# Patient Record
Sex: MALE | Employment: OTHER | URBAN - METROPOLITAN AREA
[De-identification: names, ages, dates, MRNs, and addresses within clinical notes are randomized per-mention and may not be internally consistent; named-entity substitution may affect disease eponyms.]

---

## 2017-01-05 ENCOUNTER — RECORDS - HEALTHEAST (OUTPATIENT)
Dept: LAB | Facility: CLINIC | Age: 67
End: 2017-01-05

## 2017-01-05 LAB
CHOLEST SERPL-MCNC: 124 MG/DL
FASTING STATUS PATIENT QL REPORTED: NORMAL
HDLC SERPL-MCNC: 48 MG/DL
LDLC SERPL CALC-MCNC: 61 MG/DL
TRIGL SERPL-MCNC: 75 MG/DL

## 2018-04-16 ENCOUNTER — RECORDS - HEALTHEAST (OUTPATIENT)
Dept: LAB | Facility: CLINIC | Age: 68
End: 2018-04-16

## 2018-04-16 LAB
ALBUMIN SERPL-MCNC: 3.8 G/DL (ref 3.5–5)
ALP SERPL-CCNC: 99 U/L (ref 45–120)
ALT SERPL W P-5'-P-CCNC: 15 U/L (ref 0–45)
ANION GAP SERPL CALCULATED.3IONS-SCNC: 12 MMOL/L (ref 5–18)
AST SERPL W P-5'-P-CCNC: 16 U/L (ref 0–40)
BILIRUB SERPL-MCNC: 0.7 MG/DL (ref 0–1)
BUN SERPL-MCNC: 22 MG/DL (ref 8–22)
CALCIUM SERPL-MCNC: 9.7 MG/DL (ref 8.5–10.5)
CHLORIDE BLD-SCNC: 101 MMOL/L (ref 98–107)
CO2 SERPL-SCNC: 25 MMOL/L (ref 22–31)
CREAT SERPL-MCNC: 0.9 MG/DL (ref 0.7–1.3)
GFR SERPL CREATININE-BSD FRML MDRD: >60 ML/MIN/1.73M2
GLUCOSE BLD-MCNC: 256 MG/DL (ref 70–125)
POTASSIUM BLD-SCNC: 4.1 MMOL/L (ref 3.5–5)
PROT SERPL-MCNC: 6.9 G/DL (ref 6–8)
SODIUM SERPL-SCNC: 138 MMOL/L (ref 136–145)

## 2018-08-30 ENCOUNTER — RECORDS - HEALTHEAST (OUTPATIENT)
Dept: LAB | Facility: CLINIC | Age: 68
End: 2018-08-30

## 2018-08-30 LAB
ALBUMIN SERPL-MCNC: 4 G/DL (ref 3.5–5)
ALP SERPL-CCNC: 72 U/L (ref 45–120)
ALT SERPL W P-5'-P-CCNC: 17 U/L (ref 0–45)
ANION GAP SERPL CALCULATED.3IONS-SCNC: 9 MMOL/L (ref 5–18)
AST SERPL W P-5'-P-CCNC: 20 U/L (ref 0–40)
BILIRUB SERPL-MCNC: 1.1 MG/DL (ref 0–1)
BUN SERPL-MCNC: 16 MG/DL (ref 8–22)
CALCIUM SERPL-MCNC: 9.8 MG/DL (ref 8.5–10.5)
CHLORIDE BLD-SCNC: 101 MMOL/L (ref 98–107)
CHOLEST SERPL-MCNC: 125 MG/DL
CO2 SERPL-SCNC: 28 MMOL/L (ref 22–31)
CREAT SERPL-MCNC: 0.81 MG/DL (ref 0.7–1.3)
FASTING STATUS PATIENT QL REPORTED: NORMAL
GFR SERPL CREATININE-BSD FRML MDRD: >60 ML/MIN/1.73M2
GLUCOSE BLD-MCNC: 242 MG/DL (ref 70–125)
HDLC SERPL-MCNC: 59 MG/DL
LDLC SERPL CALC-MCNC: 57 MG/DL
POTASSIUM BLD-SCNC: 4.2 MMOL/L (ref 3.5–5)
PROT SERPL-MCNC: 6.7 G/DL (ref 6–8)
SODIUM SERPL-SCNC: 138 MMOL/L (ref 136–145)
TRIGL SERPL-MCNC: 45 MG/DL

## 2019-04-05 ENCOUNTER — TRANSFERRED RECORDS (OUTPATIENT)
Dept: HEALTH INFORMATION MANAGEMENT | Facility: CLINIC | Age: 69
End: 2019-04-05

## 2019-04-05 LAB — HBA1C MFR BLD: 11 % (ref 4.2–6.1)

## 2019-06-11 NOTE — PROGRESS NOTES
SUBJECTIVE/OBJECTIVE:                           Maik Hammond is a 68 year old male coming in for an initial visit for Medication Therapy Management.  He was referred to me from Dr. Freire for diabetes medications.    PCP:  Dr. Freire, [facility]    Chief Complaint: Medication review, diabetes management.    Allergies/ADRs: Bydureon, severe nausea  Tobacco: No tobacco use  Alcohol: not currently using  Caffeine: 4 cups/day of coffee  Activity: Does a Wii workout, 1 hr every other day.  Mostly cardio, with jumping jacks and other exercises.  Medical History:  DOFV: 9/6/05c  DM 2 - meds/labs/evals through [facility]  hyperlipidemia  hx of MI 2000  hypertension    Medication Adherence/Access:  no issues reported.  Has  $450 deductible at beginning of the year, but his meds are affordable after that.   Per patient, misses medication 0 times per week.     Diabetes:    Pt currently taking Metformin  mg BID, Lantus 23 units daily (increased from 21 units last week), and pt restarted glimepiride 2mg QAM on his own 3 days ago.  He does not want to add another injectable medication, and wants to discuss oral medication options.    Pt is experiencing the following side effects: mild tremors since starting glimepiride again.  This was more significant when on Bydureon.  Pt also previously had acid reflux from glimepiride when on 2 mg BID.  Pt denies GI upset, diarrhea with metformin.  He stopped Bydureon about a month ago, and is just starting to get his appetite back now.  He reports losing significant weight, could barely eat, and didn't want to get out of bed.  Hx: Farxiga, stopped when changed to basal insulin.  Invokana - doesn't recall having side effects, thinks it was stopped due to cost.  Originally had samples.  Pioglitazone, doesn't remember why stopped.  SMBG: one time daily.     Ranges (patient reported, mg/dL):   6/12 - 202  6/11 - 370  6/10 - 316  Recent symptoms of high blood sugar? polydipsia,  polyuria, polyphagia, fatigue, tingling and numbness  Eye exam: up to date  Foot exam: up to date  ACEi/ARB: Yes: Lisinopril 5mg daily.   Aspirin: Taking 81mg daily and denies side effects  Diet/Exercise:   Breakfast: Chicken fahita ingredients with out tortilla,   Lunch: sandwich, and a cookie  Dinner: liver sausage and pickles.  Doesn't usually snack a lot.       GLYCOSYLATED HGB A1C - 04/05/2019      NAME VALUE REFERENCE RANGE LAB   F HGB A1C 11.0 H 4.2-6.1 (%)          GLYCOSYLATED HGB A1C - 12/04/2018      NAME VALUE REFERENCE RANGE LAB   F HGB A1C 10.2 H 4.2-6.1 (%)          Hypertension: Current medications include Lisinopril 5mg daily.  Patient does not self-monitor BP.  Patient reports no current medication side effects.    Hyperlipidemia:   Current therapy includes Simvastatin 40mg once daily.  Pt reports no significant myalgias or other side effects.      LIPID CASCADE - 08/30/2018      NAME VALUE REFERENCE RANGE    CHOLESTEROL 125 <=199 (mg/dL)    TRIGLYCERIDES 45 <=149 (mg/dL)    HDL CHOLESTEROL 59 >=40 (mg/dL)    LDL CHOLESTEROL CALCULATED 57 <=129 (mg/dL)       GERD:   Current medications include: Zantac (ranitidine) 150mg twice daily. Pt c/o nausea, but improving since stopping Bydureon. When he was on glimepiride 2mg BID, he noticed this worsened his acid reflux.  Patient feels that current regimen is effective.    Supplements/OTCs:   Currently taking Multivitamin daily, Co-Q 10 300mg daily PRN, and Move Free Condroitin 1 tablet daily.  Denies issues with these, and feels the condroitin is very effective for joint pain.        COMPREHENSIVE METABOLIC - 08/30/2018       NAME VALUE REFERENCE RANGE    SODIUM 138 136-145 (mmol/L)    POTASSIUM 4.2 3.5-5.0 (mmol/L)    CHLORIDE 101  (mmol/L)    CO2 28 22-31 (mmol/L)    ANION GAP, CALCULATION 9 5-18 (mmol/L)    GLUCOSE 242 H  (mg/dL)    BLOOD UREA NITROGEN 16 8-22 (mg/dL)    CREATININE 0.81 0.70-1.30 (mg/dL)    GFR MDRD AF AMER >60 >60  (mL/min/1.73m2)    GFR MDRD NON AF AMER >60 >60 (mL/min/1.73m2)    BILIRUBIN, TOTAL 1.1 H 0.0-1.0 (mg/dL)    CALCIUM 9.8 8.5-10.5 (mg/dL)    PROTEIN, TOTAL 6.7 6.0-8.0 (g/dL)    ALBUMIN 4.0 3.5-5.0 (g/dL)    ALKALINE PHOSPHATASE 72  (U/L)    AST 20 0-40 (U/L)    ALT 17 0-45 (U/L)       Today's Vitals: /78 (BP Location: Left arm, Cuff Size: Adult Regular)       ASSESSMENT:                             Current medications were reviewed today. Medicare Part D topics discussed:Self-monitoring, Medication safety, Lab monitoring, Recommendations to doctor and Medication changes    Medication Adherence: excellent, no issues identified    Diabetes:   Needs Improvement.   Patient's A1c of 11% is not meeting A1c goal of < 7.5%. Self monitoring of blood glucose is not at goal of fasting  mg/dL.   Pt had resumed glimepiride on his own 3 days ago.  Pt would benefit from continuing his current trial back on glimepiride 2mg daily, as his morning fasting BG have improved.  If his tremors worsen, or the acid reflux returns could consider other therapy.    An SGLT2-inhibitor would be a good option.  This would likely reduce A1c between 0.8-1%, may help with weight loss, improve BP.  Jardiance specifically has shown to help prevent decline in renal dysfunction, and beneficial in patients with ASCVD.  Jardiance is covered by his insurance per the online formulary.  Would prefer to avoid injectable medications, and pt had severe nausea on Bydureon.    Could also consider maximizing his dose of Metformin, but may be beneficial to check is serum creatinine prior to doing this.  Could consider a DPP-4 inhibitors, but would be the same cost as SGLT2 inhibitor and less effective (A1c reduction 0.5-0.7%)  Aspirin therapy is safe and appropriate.    Hypertension:   Stable. Patient is meeting BP goal of < 140/90mmHg.     Hyperlipidemia:   Stable.   Pt is not on high intensity statin which is indicated based on 2018 ACC/AHA  guidelines for lipid management, however wouldn't recommend changing to a high intensity statin.  His last LDL was 57, and his MI occurred 19 years ago.  Pt would prefer not to increase as well.    GERD:   Stable.  Will want to monitor for increased acid reflux now that he started glimepiride.    Supplements/OTCs:   Stable.    PLAN:                              1) Recommend continuing glimepiride 2 mg daily, and follow up in 2 weeks on home blood glucose.    2)  At follow up if BG still above goal, could consider adding Jardiance 10 mg once daily and rechecking BMP 2 weeks later.    Dr. Freire agreed with recommendations:  Dutch Freire MD 6/13/2019 10:37:00 AM > sounds like a good plan.  thank you.       I spent 60 minutes with this patient today (an extra 15 minutes was spent creating the Medication Action Plan). I offer these suggestions for consideration by Dr. Freire. A copy of the visit note was provided to the patient's primary care provider.    Will follow up on 6/26 at 2:30 PM via phone encounter.    The patient declined a summary of these recommendations as an after visit summary.     Mel Silverman, PharmD  Medication Therapy Management Pharmacist  Pager: [phone]

## 2019-06-12 ENCOUNTER — OFFICE VISIT (OUTPATIENT)
Dept: PHARMACY | Facility: PHYSICIAN GROUP | Age: 69
End: 2019-06-12
Payer: COMMERCIAL

## 2019-06-12 VITALS — SYSTOLIC BLOOD PRESSURE: 125 MMHG | DIASTOLIC BLOOD PRESSURE: 78 MMHG

## 2019-06-12 DIAGNOSIS — E78.5 HYPERLIPIDEMIA LDL GOAL <70: ICD-10-CM

## 2019-06-12 DIAGNOSIS — K21.9 GASTROESOPHAGEAL REFLUX DISEASE WITHOUT ESOPHAGITIS: ICD-10-CM

## 2019-06-12 DIAGNOSIS — E11.8 TYPE 2 DIABETES MELLITUS WITH COMPLICATION, WITHOUT LONG-TERM CURRENT USE OF INSULIN (H): Primary | ICD-10-CM

## 2019-06-12 DIAGNOSIS — I10 BENIGN ESSENTIAL HYPERTENSION: ICD-10-CM

## 2019-06-12 DIAGNOSIS — Z78.9 TAKES DIETARY SUPPLEMENTS: ICD-10-CM

## 2019-06-12 PROCEDURE — 99207 ZZC NO CHARGE LOS: CPT | Performed by: PHARMACIST

## 2019-06-12 RX ORDER — BLOOD-GLUCOSE METER
KIT MISCELLANEOUS 2 TIMES DAILY
COMMUNITY

## 2019-06-12 NOTE — LETTER
"     [facility]     Date: 2019    Maik Hammond  [address] [address]    Dear Mr. Hammond,    Thank you for talking with me on 2019 about your health and medications. Medicare s MTM (Medication Therapy Management) program helps you understand your medications and use them safely.      This letter includes an action plan (Medication Action Plan) and medication list (Personal Medication List). The action plan has steps you should take to help you get the best results from your medications. The medication list will help you keep track of your medications and how to use them the right way.       Have your action plan and medication list with you when you talk with your doctors, pharmacists, and other healthcare providers in your care team.     Ask your doctors, pharmacists, and other healthcare providers to update the action plan and medication list at every visit.     Take your medication list with you if you go to the hospital or emergency room.     Give a copy of the action plan and medication list to your family or caregivers.     If you want to talk about this letter or any of the papers with it, please call   [phone].   We look forward to working with you, your doctors, and other healthcare providers to help you stay healthy.     Sincerely,    Mel Silverman      Enclosed: Medication Action Plan and Personal Medication List    MEDICATION ACTION PLAN FOR Maik Hammond,  1950     This action plan will help you get the best results from your medications if you:   1. Read \"What we talked about.\"   2. Take the steps listed in the \"What I need to do\" boxes.   3. Fill in \"What I did and when I did it.\"   4. Fill in \"My follow-up plan\" and \"Questions I want to ask.\"     Have this action plan with you when you talk with your doctors, pharmacists, and other healthcare providers in your care team. Share this with your family or caregivers " too.  DATE PREPARED: 2019  What we talked about:  We will follow up in two weeks on your blood sugars, and if they are still high we can consider adding a medication like Jardiance to help get your blood sugars down.  I will discuss this with Dr. Freire and see what he thinks about this plan.                             What I need to do: Continue taking glimepiride 2 mg once daily, and checking home blood sugars over the next two weeks.        What I did and when I did it:                                              My follow-up plan:                 Questions I want to ask:              If you have any questions about your action plan, call Mel Dolorescarmen, Phone: [phone] , Monday-Friday 8-4:30pm.           MEDICATION LIST FOR Maik Hammond, ARACELI 1950     This medication list was made for you after we talked. We also used information from your doctor's chart.      Use blank rows to add new medications. Then fill in the dates you started using them.    Cross out medications when you no longer use them. Then write the date and why you stopped using them.    Ask your doctors, pharmacists, and other healthcare providers to update this list at every visit. Keep this list up-to-date with:       Prescription medications    Over the counter drugs     Herbals    Vitamins    Minerals      If you go to the hospital or emergency room, take this list with you. Share this with your family or caregivers too.     DATE PREPARED: 2019  Allergies or side effects: No clinical screening - see comments     Medication:  ASPIRIN 81 MG PO TABS      How I use it:  Take 1 tablet by mouth daily In morning      Why I use it:  Heart Protection    Prescriber:  Patient Reported      Date I started using it:       Date I stopped using it:         Why I stopped using it:            Medication:  GLIMEPIRIDE 2 MG PO TABS      How I use it:  Take 2 mg by mouth 2 times daily      Why I use it:  Diabetes    Prescriber:    Tani      Date I started using it:       Date I stopped using it:         Why I stopped using it:            Medication:  LISINOPRIL 5 MG PO TABS      How I use it:  Take 5 mg by mouth daily Morning        Why I use it:  Blood Pressure    Prescriber:  Dr. Freire      Date I started using it:       Date I stopped using it:         Why I stopped using it:            Medication:  METFORMIN HCL  MG PO TB24      How I use it:  Take 750 mg by mouth 2 times daily      Why I use it:  Diabetes    Prescriber:  Dr. Freire      Date I started using it:       Date I stopped using it:         Why I stopped using it:            Medication:  MOVE FREE PO Chondroitin      How I use it:  Take 1 capsule by mouth daily      Why I use it:  Joint Pain    Prescriber:  Patient Reported      Date I started using it:       Date I stopped using it:         Why I stopped using it:            Medication:  MULTIVITAMIN OR      How I use it:  Take 1 tablet by mouth daily      Why I use it:  Supplements    Prescriber:  Patient Reported      Date I started using it:       Date I stopped using it:         Why I stopped using it:            Medication:  SIMVASTATIN 40 MG PO TABS      How I use it:  Take 1 tablet by mouth At Bedtime      Why I use it:  High Cholesterol    Prescriber:  Dr. Freire      Date I started using it:       Date I stopped using it:         Why I stopped using it:            Medication:         How I use it:         Why I use it:      Prescriber:         Date I started using it:       Date I stopped using it:         Why I stopped using it:            Medication:         How I use it:         Why I use it:      Prescriber:         Date I started using it:       Date I stopped using it:         Why I stopped using it:            Medication:         How I use it:         Why I use it:      Prescriber:         Date I started using it:       Date I stopped using it:         Why I stopped using it:              Other  Information:     If you have any questions about your action plan, call [phone].    According to the Paperwork Reduction Act of 1995, no persons are required to respond to a collection of information unless it displays a valid OMB control number. The valid OMB number for this information collection is [id number]. The time required to complete this information collection is estimated to average 40 minutes per response, including the time to review instructions, searching existing data resources, gather the data needed, and complete and review the information collection. If you have any comments concerning the accuracy of the time estimate(s) or suggestions for improving this form, please write to: CMS, Attn: JANNY Reports Clearance Officer, [address] [place] [phone].

## 2019-06-26 ENCOUNTER — ALLIED HEALTH/NURSE VISIT (OUTPATIENT)
Dept: PHARMACY | Facility: PHYSICIAN GROUP | Age: 69
End: 2019-06-26
Payer: COMMERCIAL

## 2019-06-26 DIAGNOSIS — E11.8 TYPE 2 DIABETES MELLITUS WITH COMPLICATION, WITHOUT LONG-TERM CURRENT USE OF INSULIN (H): Primary | ICD-10-CM

## 2019-06-26 PROCEDURE — 99207 ZZC NO CHARGE LOS: CPT | Performed by: PHARMACIST

## 2019-06-26 NOTE — PROGRESS NOTES
SUBJECTIVE/OBJECTIVE:                Maik Hammond is a 68 year old male called for a follow-up visit for Medication Therapy Management.  He was referred to me from Dr. Freire. Pt also followed up with Araseli, care coordinator today.    Chief Complaint: Follow up from our visit on 6/12.  Diabetes meds.      Tobacco: No tobacco use  Alcohol: not currently using    Medication Adherence/Access:  no issues reported       Diabetes:    Pt currently taking Metformin  mg BID, Lantus 23 units daily (increased from 21 units 6/12), and glimepiride 2mg (restarted 6/16).     Pt is experiencing the following side effects: mild tremors since starting glimepiride again, and is wondering if it could be causing his joint pain.  Pt asking if taking glimepiride at night would help with joint pain.    Hx: Farxiga, stopped when changed to basal insulin.  Invokana - doesn't recall having side effects, thinks it was stopped due to cost.  Originally had samples.  Pioglitazone, doesn't remember why stopped.  SMBG: one time daily.     Ranges (patient reported, mg/dL):   156, 120 113 (last few days)  Hypoglycemia? Since restarting glimepiride, he had one episode of feeling very faint/dizzy and almost fell over.  Recent symptoms of high blood sugar? polydipsia, polyuria, polyphagia, fatigue, tingling and numbness  Eye exam: up to date  Foot exam: up to date  ACEi/ARB: Yes: Lisinopril 5mg daily.   Aspirin: Taking 81mg daily and denies side effects  Diet/Exercise:   Breakfast: Chicken fahita ingredients with out tortilla,   Lunch: sandwich, and a cookie  Dinner: liver sausage and pickles.  Doesn't usually snack a lot.        GLYCOSYLATED HGB A1C - 04/05/2019        NAME VALUE REFERENCE RANGE LAB   F HGB A1C 11.0 H 4.2-6.1 (%)              GLYCOSYLATED HGB A1C - 12/04/2018        NAME VALUE REFERENCE RANGE LAB   F HGB A1C 10.2 H 4.2-6.1 (%)               Today's Vitals: There were no vitals taken for this visit. - telephone  encounter.      ASSESSMENT:              Current medications were reviewed today as discussed above.      Medication Adherence: excellent, no issues identified    Diabetes:  Needs Improvement.   Patient's A1c of 11% is not meeting A1c goal of < 7.5%. Self monitoring of blood glucose is not at goal of fasting  mg/dL.   Possible low BG from glimepiride.  Glimepiride is not likely causing his joint paint, and I wouldn't recommend he take this at night due to risk of hypoglycemia.  It does have a peak after 3-4 hours of taking it, so could possibly take it at dinner.  Could also consider reducing dose of Lantus to prevent hypoglycemia, or reducing to glimepiride 1mg daily.  Pt due for next PCP apt after 7/5 so will be rescheduling soon.  Pt will call PCP if he has any other low BG.      PLAN:                  1) Continue current medications.  Pt agrees to call clinic if he continues to experience low BG.      I spent 30 minutes with this patient today. I offer these suggestions for consideration by dr. Freire. A copy of the visit note was provided to the patient's referring provider.     Will follow up in 2 weeks.    The patient declined a summary of these recommendations as an after visit summary.    Mel Silverman, PharmD  Medication Therapy Management Pharmacist  Pager: [phone]

## 2019-08-09 ENCOUNTER — TRANSFERRED RECORDS (OUTPATIENT)
Dept: HEALTH INFORMATION MANAGEMENT | Facility: CLINIC | Age: 69
End: 2019-08-09

## 2019-08-09 LAB — HBA1C MFR BLD: 9.3 % (ref 4.2–6.1)

## 2019-09-18 ENCOUNTER — ALLIED HEALTH/NURSE VISIT (OUTPATIENT)
Dept: PHARMACY | Facility: PHYSICIAN GROUP | Age: 69
End: 2019-09-18
Payer: COMMERCIAL

## 2019-09-18 DIAGNOSIS — E11.8 TYPE 2 DIABETES MELLITUS WITH COMPLICATION, WITHOUT LONG-TERM CURRENT USE OF INSULIN (H): Primary | ICD-10-CM

## 2019-09-18 PROCEDURE — 99207 ZZC NO CHARGE LOS: CPT | Performed by: PHARMACIST

## 2019-09-18 NOTE — PROGRESS NOTES
"SUBJECTIVE/OBJECTIVE:                Maik Hammond is a 68 year old male called for a follow-up visit for Medication Therapy Management.  He was referred to me from Dr. Freire.     Chief Complaint: Follow up from our visit on 6/26/19.  Follow up on diabetes.    Tobacco: No tobacco use  Alcohol: not currently using    Medication Adherence/Access:  Pt not taking Jardiance daily due to adverse effects.  Jardiance was expensive was around 300 dollars, but may also be in the coverage gap.    Diabetes:    Pt currently taking Metformin  mg BID, Lantus 23 units daily, glimepiride 2mg daily (restarted 6/16), Jardiance 10 mg taking twice weekly (started 8/9).  Pt is reporting urination urgency and increased frequency the day he takes it.  Has only been taking it twice per week, when he \"needs it\".  Tried to take Jardiance on days when he will be eating poorly, like on football games days.  Only taking 1 glimepiride daily, does have some shaking and can't write anything.     Pt is experiencing the following side effects: tremors in his hands since starting glimepiride again, makes it difficult to write.  Hx: Farxiga, stopped when changed to basal insulin.  Invokana - doesn't recall having side effects, thinks it was stopped due to cost.  Originally had samples.  Pioglitazone, doesn't remember why stopped.  SMBG: one time daily, AM fasting     Ranges (patient reported, mg/dL):   130-150 mg/dl most days, and the lowest was 110.  If not eating well, it may be up to 200 the next morning.    Hypoglycemia? None  Recent symptoms of high blood sugar? polydipsia, polyuria, polyphagia, fatigue, tingling and numbness  Eye exam: up to date  Foot exam: up to date  ACEi/ARB: Yes: Lisinopril 5mg daily.   Aspirin: Taking 81mg daily and denies side effects  Diet/Exercise: Pt has been more active and exercising a lot more.  Breakfast: Chicken fahita ingredients with out tortilla,   Lunch: sandwich, and a cookie  Dinner: liver sausage " and pickles.  Doesn't usually snack a lot.      GLYCOSYLATED HGB A1C - 08/09/2019       NAME VALUE REFERENCE RANGE LAB   F HGB A1C 9.3 H           Today's Vitals: There were no vitals taken for this visit. - telephone encounter.      ASSESSMENT:              Current medications were reviewed today as discussed above.      Medication Adherence: good, no issues identified    Diabetes:  Improving   Patient's A1c of 9.3% is not meeting A1c goal of < 7.5%. It has improved significantly since last visit. Monitoring of blood glucose is not at goal of fasting  mg/dL.   Pt would likely have improved blood sugars if he took Jardiance daily, but is having side effects when he takes it.  His blood sugars the mornings after taking it are significantly better compared to days when he hasn't ate as healthy.  He may benefit from trying 5 mg Jardiance every other day, but declines using it more frequently at this time.  He doesn't want to stop it, and feels it has been manageable the way he is taking it.  Pt would prefer to avoid trying alternative GLP-1 agonists due to severe GI symptom with Bydureon.  Pioglitazone would not be a good option for him given his CVD history, and had been on it previously.  Would prefer to avoid mealtime insulin.  Pt hesitant to increase basal insulin, but could consider slight dose titration as well at next PCP follow up.  He would also benefit from continuing to increase physical activity and eat healthy.  Would want to avoid increasing glimepiride due to current tremors he gets with this.    PLAN:                  1) Continue current medications.  Discussed that patient could try Jardiance 5 mg every other day to see if better tolerated at lower dose.  Patient will consider.    I spent 30 minutes with this patient today. I offer these suggestions for consideration by Dr. Freire. A copy of the visit note was provided to the patient's primary care provider.     Will follow up in 2 months.    The  patient declined a summary of these recommendations as an after visit summary.    Mel Silverman, PharmD  Medication Therapy Management Pharmacist  Pager: [phone]

## 2019-11-11 ENCOUNTER — RECORDS - HEALTHEAST (OUTPATIENT)
Dept: LAB | Facility: CLINIC | Age: 69
End: 2019-11-11

## 2019-11-11 LAB
ALBUMIN SERPL-MCNC: 3.9 G/DL (ref 3.5–5)
ALP SERPL-CCNC: 76 U/L (ref 45–120)
ALT SERPL W P-5'-P-CCNC: 18 U/L (ref 0–45)
ANION GAP SERPL CALCULATED.3IONS-SCNC: 10 MMOL/L (ref 5–18)
AST SERPL W P-5'-P-CCNC: 19 U/L (ref 0–40)
BILIRUB SERPL-MCNC: 1 MG/DL (ref 0–1)
BUN SERPL-MCNC: 19 MG/DL (ref 8–28)
CALCIUM SERPL-MCNC: 9.2 MG/DL (ref 8.5–10.5)
CHLORIDE BLD-SCNC: 102 MMOL/L (ref 98–107)
CHOLEST SERPL-MCNC: 122 MG/DL
CO2 SERPL-SCNC: 27 MMOL/L (ref 22–31)
CREAT SERPL-MCNC: 0.84 MG/DL (ref 0.7–1.3)
FASTING STATUS PATIENT QL REPORTED: NORMAL
GFR SERPL CREATININE-BSD FRML MDRD: >60 ML/MIN/1.73M2
GLUCOSE BLD-MCNC: 167 MG/DL (ref 70–125)
HDLC SERPL-MCNC: 59 MG/DL
LDLC SERPL CALC-MCNC: 49 MG/DL
POTASSIUM BLD-SCNC: 4.1 MMOL/L (ref 3.5–5)
PROT SERPL-MCNC: 6.6 G/DL (ref 6–8)
SODIUM SERPL-SCNC: 139 MMOL/L (ref 136–145)
TRIGL SERPL-MCNC: 72 MG/DL

## 2020-05-11 ENCOUNTER — TRANSFERRED RECORDS (OUTPATIENT)
Dept: HEALTH INFORMATION MANAGEMENT | Facility: CLINIC | Age: 70
End: 2020-05-11

## 2020-05-11 ENCOUNTER — RECORDS - HEALTHEAST (OUTPATIENT)
Dept: LAB | Facility: CLINIC | Age: 70
End: 2020-05-11

## 2020-05-11 LAB
HBA1C MFR BLD: 9 % (ref 4.2–6.1)
PSA SERPL-MCNC: 0.7 NG/ML (ref 0–6.5)

## 2020-09-18 ENCOUNTER — TRANSFERRED RECORDS (OUTPATIENT)
Dept: HEALTH INFORMATION MANAGEMENT | Facility: CLINIC | Age: 70
End: 2020-09-18

## 2020-09-18 LAB — HBA1C MFR BLD: 9.1 % (ref 4.2–6.1)

## 2020-09-18 ASSESSMENT — MIFFLIN-ST. JEOR
SCORE: 1569.68
SCORE: 1569.68

## 2020-09-29 ENCOUNTER — ALLIED HEALTH/NURSE VISIT (OUTPATIENT)
Dept: PHARMACY | Facility: PHYSICIAN GROUP | Age: 70
End: 2020-09-29
Payer: COMMERCIAL

## 2020-09-29 VITALS
WEIGHT: 189.6 LBS | DIASTOLIC BLOOD PRESSURE: 70 MMHG | BODY MASS INDEX: 29.76 KG/M2 | HEIGHT: 67 IN | HEART RATE: 88 BPM | SYSTOLIC BLOOD PRESSURE: 114 MMHG

## 2020-09-29 DIAGNOSIS — I10 BENIGN ESSENTIAL HYPERTENSION: ICD-10-CM

## 2020-09-29 DIAGNOSIS — E78.5 HYPERLIPIDEMIA LDL GOAL <70: ICD-10-CM

## 2020-09-29 DIAGNOSIS — E11.8 TYPE 2 DIABETES MELLITUS WITH COMPLICATION, WITHOUT LONG-TERM CURRENT USE OF INSULIN (H): Primary | ICD-10-CM

## 2020-09-29 PROCEDURE — 99605 MTMS BY PHARM NP 15 MIN: CPT | Performed by: PHARMACIST

## 2020-09-29 PROCEDURE — 99607 MTMS BY PHARM ADDL 15 MIN: CPT | Performed by: PHARMACIST

## 2020-09-29 NOTE — LETTER
"     [facility] - GEOVANNA MTM     Date: 2020    Maik Hammond  [address] [address]    Dear Mr. Hammond,    Thank you for talking with me on 2020 about your health and medications. Medicare s MTM (Medication Therapy Management) program helps you understand your medications and use them safely.      This letter includes an action plan (Medication Action Plan) and medication list (Personal Medication List). The action plan has steps you should take to help you get the best results from your medications. The medication list will help you keep track of your medications and how to use them the right way.       Have your action plan and medication list with you when you talk with your doctors, pharmacists, and other healthcare providers in your care team.     Ask your doctors, pharmacists, and other healthcare providers to update the action plan and medication list at every visit.     Take your medication list with you if you go to the hospital or emergency room.     Give a copy of the action plan and medication list to your family or caregivers.     If you want to talk about this letter or any of the papers with it, please call   [phone].   We look forward to working with you, your doctors, and other healthcare providers to help you stay healthy.     Sincerely,    Mel Silverman [facility]      Enclosed: Medication Action Plan and Personal Medication List    MEDICATION ACTION PLAN FOR Maik Hammond,  1950     This action plan will help you get the best results from your medications if you:   1. Read \"What we talked about.\"   2. Take the steps listed in the \"What I need to do\" boxes.   3. Fill in \"What I did and when I did it.\"   4. Fill in \"My follow-up plan\" and \"Questions I want to ask.\"     Have this action plan with you when you talk with your doctors, pharmacists, and other healthcare providers in your care team. Share this with your family or caregivers " too.  DATE PREPARED: 2020  What we talked about: You mentioned that the glimepiride causes tremors or shaking, and you would like to get off it.  We can attempt a trial off it, and then switch to an alternative medication.  Shaking or tremors are not a common side effect with this class of medications, so we can try an alternative medication (glipizide).  You were on this in the past and didn't have the tremors with this medication.                                         What I need to do: You can stop taking glimepiride for 1-2 weeks to see if the tremors or shaking improves at all.  If your blood sugar is high while going off the glimepiride, please take the full dose of your Jardiance 10 mg once daily to help counter act the increase in blood sugar.     Then after two weeks you can start taking glipizide XL 2.5 mg once daily in the morning before breakfast.       What I did and when I did it:                                              My follow-up plan:                 Questions I want to ask:              If you have any questions about your action plan, call Mel Silverman CHRISTOF[facility], Phone: [phone] , Monday-Friday 8-4:30pm.           MEDICATION LIST FOR Maik Hammond,  1950     This medication list was made for you after we talked. We also used information from your doctor's chart.      Use blank rows to add new medications. Then fill in the dates you started using them.    Cross out medications when you no longer use them. Then write the date and why you stopped using them.    Ask your doctors, pharmacists, and other healthcare providers to update this list at every visit. Keep this list up-to-date with:       Prescription medications    Over the counter drugs     Herbals    Vitamins    Minerals      If you go to the hospital or emergency room, take this list with you. Share this with your family or caregivers too.     DATE PREPARED: 2020  Allergies or side effects: No clinical  screening - see comments     Medication:  ASPIRIN 81 MG PO TABS      How I use it:  Take 1 tablet by mouth daily In morning      Why I use it:  Prevention of heart attack and stroke    Prescriber:  Dr. Freire      Date I started using it:       Date I stopped using it:         Why I stopped using it:            Medication:  COENZYME Q10 300 MG PO CAPS      How I use it:  Take 300 mg by mouth daily      Why I use it:  Supplements    Prescriber:  Patient Reported      Date I started using it:       Date I stopped using it:         Why I stopped using it:            Medication:  CONTOUR BLOOD GLUCOSE SYSTEM W/DEVICE KIT      How I use it:  2 times daily      Why I use it:  Diabetes    Prescriber:  Dr. Freire      Date I started using it:       Date I stopped using it:         Why I stopped using it:            Medication:  GLIPIZIDE XL 2.5 MG TABLETS      How I use it:  Take 2.5 mg by mouth daily in the morning before breakfast      Why I use it:  Diabetes    Prescriber:  Dr. Freire      Date I started using it:       Date I stopped using it:         Why I stopped using it:            Medication:  INSULIN GLARGINE  UNIT/ML SC SOPN      How I use it:  Inject 23 Units Subcutaneous every morning       Why I use it:  Diabetes    Prescriber:  Dr. Freire      Date I started using it:       Date I stopped using it:         Why I stopped using it:            Medication:  LISINOPRIL 5 MG PO TABS      How I use it:  Take 5 mg by mouth daily Morning        Why I use it: High blood pressure/ Kidney protection    Prescriber:  Dr. Freire      Date I started using it:       Date I stopped using it:         Why I stopped using it:            Medication:  METFORMIN HCL  MG PO TB24      How I use it:  Take 750 mg by mouth 2 times daily      Why I use it:  Diabetes    Prescriber:  Dr. Freire      Date I started using it:       Date I stopped using it:         Why I stopped using it:            Medication:  MOVE FREE PO       How I use it:  Take 1 capsule by mouth daily      Why I use it:  Supplements    Prescriber:  Patient Reported      Date I started using it:       Date I stopped using it:         Why I stopped using it:            Medication:  MULTIVITAMIN OR      How I use it:  Take 1 tablet by mouth daily      Why I use it:  Supplements    Prescriber:  Patient Reported      Date I started using it:       Date I stopped using it:         Why I stopped using it:            Medication:  SIMVASTATIN 40 MG PO TABS      How I use it:  Take 1 tablet by mouth At Bedtime      Why I use it:  High Cholesterol    Prescriber:  Dr. Freire      Date I started using it:       Date I stopped using it:         Why I stopped using it:            Medication:         How I use it:         Why I use it:      Prescriber:         Date I started using it:       Date I stopped using it:         Why I stopped using it:            Medication:         How I use it:         Why I use it:      Prescriber:         Date I started using it:       Date I stopped using it:         Why I stopped using it:            Medication:         How I use it:         Why I use it:      Prescriber:         Date I started using it:       Date I stopped using it:         Why I stopped using it:              Other Information:     If you have any questions about your action plan, call [phone].    According to the Paperwork Reduction Act of 1995, no persons are required to respond to a collection of information unless it displays a valid OMB control number. The valid OMB number for this information collection is 3896-7587. The time required to complete this information collection is estimated to average 40 minutes per response, including the time to review instructions, searching existing data resources, gather the data needed, and complete and review the information collection. If you have any comments concerning the accuracy of the time estimate(s) or suggestions for  improving this form, please write to: CMS, Attn: JANNY Reports Clearance Officer, [address] [place] [address], [place], [place] [address].

## 2020-09-29 NOTE — PATIENT INSTRUCTIONS
Recommendations from today's MTM visit:                                                    MTM (medication therapy management) is a service provided by a clinical pharmacist designed to help you get the most of out of your medicines.   Today we reviewed what your medicines are for, how to know if they are working, that your medicines are safe and how to make your medicine regimen as easy as possible.       1.  Recommend stopping glimepiride and starting Glipizide ER 2.5 mg daily.  I will talk to Dr. rFeire about this and if he's OK with the change will send in a prescription.  I would recommend stopping glimepiride for 1-2 weeks to see if the tremor gets better before starting the glipizide.  During this time you may need to take the full dose of Jardiance 10 mg to help with the blood sugars.    Future Considerations:  2.  Could consider increasing Jardiance to 25 mg, and taking a half tablet daily.  This would cost the same as your current dose of Jardiance.  If we did this, we would want to recheck your kidney labs two weeks after a dose increase.  3.  Recommend increasing Metformin to ER 1000 mg twice daily.      I couldn't find the income cut off information for the Jardiance patient assistance program, it isn't posted online.  Please call them to ask if you might qualify for the program:   [phone]     It was great to speak with you today.  I value your experience and would be very thankful for your time with providing feedback on our clinic survey. You may receive a survey via email or text message in the next few days.     Next MTM visit: 10/20/2020 at 1 PM, telephone follow up    To schedule another MTM appointment, please call the clinic directly or you may call the MTM scheduling line at [phone] or toll-free at [phone].     My Clinical Pharmacist's contact information:                                                      It was a pleasure talking with you today!  Please feel free to  contact me with any questions or concerns you have.      Mel Silverman PharmD  Medication Therapy Management Pharmacist  Pager: [phone]

## 2020-09-29 NOTE — PROGRESS NOTES
MTM ENCOUNTER  SUBJECTIVE/OBJECTIVE:                           Maik Hammond is a 69 year old male called for a follow-up visit. He was referred to me from Dr. Freire.  Today's visit is a follow-up MTM visit from 9/8/2019.    Patient consented to a telehealth visit: yes  Telemedicine Visit Details  Type of service:  Telephone visit  Start Time: 1:01  PM  End Time: 1:58 PM  Originating Location (pt. Location): Home  Distant Location (provider location):  [facility]  Mode of Communication:  Telephone    Chief Complaint: Comprehensive medication review for 2020.  Personal Healthcare Goals: Patient wants to get off glimepiride.      Allergies/ADRs: Reviewed in chart  Tobacco: He reports that he has never smoked. He does not have any smokeless tobacco history on file.  Alcohol: not currently using  Caffeine: 3 cups/day of coffee  Activity: walks 1 mile per day  PMH: Reviewed in chart    Medication Adherence/Access:  Cost is a big concern, Jardiance is very expensive and is in the donut hole right now.  Has been taking Jardiance 1/2 tablet to help with the cost, it lasts longer.    Type 2 Diabetes:  Pt currently taking Metformin  mg twice daily, Lantus 29 units daily, glimepiride 2 mg daily, and Jardiance 5 mg once daily.  Dr. Freire increased the Lantus a few days ago   The Jardiance is very expensive so he stretches it out by cutting in half. He only takes 10 mg if his BG is above 200 in the morning, and only doing this about 2-3 days per month.   Pt reports urination urgency and increased frequency sometimes with Jardiance, but not bothersome.  He has been trying to avoid snacking/eating in the evening, but this is hard to do.  Pt is experiencing the following side effects: tremors in his hands since starting glimepiride again, makes it difficult to write.  Denies GI upset, diarrhea with Metformin.   Hx: Farxiga, stopped when changed to basal insulin.  Invokana - doesn't recall having  side effects, thinks it was stopped due to cost.  Originally had samples.  Pioglitazone, doesn't remember why stopped.  Onglyza, stopped due to cost.  Glipizide XL - no side effects, stopped when he switched to Onglyza.  Pt hasn't tried metformin 1000 mg BID in the past.    SMBG: one time daily, AM fasting     Ranges (patient reported, mg/dL):   130-150 mg/dl most days, and the lowest was 110.  If not eating well, it may be up to 200 the next morning.    Hypoglycemia? None  Recent symptoms of high blood sugar? polydipsia, polyuria, polyphagia, fatigue, tingling and numbness  Eye exam: up to date  Foot exam: up to date  ACEi/ARB: Yes: Lisinopril 5mg daily.   Aspirin: Taking 81mg daily and denies side effects  Diet/Exercise: as been going on a 1 mile walk every day.  Breakfast: Chicken fahita ingredients with out tortilla,   Lunch: sandwich, and a cookie  Dinner: liver sausage and pickles.  Doesn't usually snack a lot.  Last BMP WNL      GLYCOSYLATED HGB A1C - 09/18/2020      NAME VALUE REFERENCE RANGE LAB   F HGB A1C 9.1  H            Lab Results   Component Value Date    A1C 9.0 05/11/2020    A1C 9.3 08/09/2019    A1C 11.0 04/05/2019       Hypertension:   Current medications include Lisinopril 5 mg daily.  Patient does not self-monitor BP.  Patient reports no current medication side effects.    Hyperlipidemia:   Current therapy includes Simvstatin 40 mg once daily.  Pt reports no significant myalgias or other side effects.      LIPID CASCADE - 11/11/2019      NAME VALUE REFERENCE RANGE    CHOLESTEROL 122 <=199 (mg/dL)    TRIGLYCERIDES 72 <=149 (mg/dL)    HDL CHOLESTEROL 59 >=40 (mg/dL)    LDL CHOLESTEROL CALCULATED 49 <=129 (mg/dL)         9/18/2020:  Vitals: Ht 66.75, Wt 189.6, BMI 29.92, Pulse 88, /70, Arm / Cuff size Right, large, Initials KAMI Peterson.      Today's Vitals: There were no vitals taken for this visit. - telephone encounter.        ASSESSMENT:                              Medication Adherence:  Not taking Jardiance full dose due to cost.    Type 2 Diabetes:   Patient is not meeting A1c goal of < 7.5%. Self monitoring of blood glucose is not at goal of fasting  mg/dL. Pt would benefit from Increasing Jardiance to 10 mg daily, but cost is a barrier.  We could switch to the 25 mg tablet, and then he could take a 1/2 tablet of this (12.5 mg daily).  Would need to recheck Scr 2 weeks after starting the higher dose.  Patient would also benefit from maximizing his metformin ER to 1000 mg twice daily.  He would want to finish his current supply of Metformin  mg tablets, and has enough until next year.    Patients biggest goal is to get off glimepiride.  We could switch this to alternative sulfonylurea, like glipizide.  It doesn't appear he had tremors or shaking with this in the past.  May be beneficial to try going off glimepiride for 1-2 weeks to see if the shaking/tremor improve  He could take the full 10 mg dose of Jardiance during this time if BG increase.  Shaking or tremors is not a likely side effect with any sulfonylurea, so if the shaking doesn't improve when stopping glimepiride would be beneficial to consider a neurologist referral to further assess his tremors.  The ADA recommends doing a yearly microalbumin to monitor renal changes, due for thi.    Hypertension:   Stable. Patient is meeting blood pressure goal of < 130/80mmHg.    Hyperlipidemia:   Stable.  Pt is on moderate intensity statin which is indicated based on 2019 ACC/AHA guidelines for lipid management.        PLAN:                              1.  Recommend stopping glimepiride and starting Glipizide ER 2.5 mg daily.    2.  Recommend Microalbuminuria at next appointment.    Future Considerations:  3.  Recommend increasing Metformin to ER 1000 mg twice daily.    Will look into patient assistance program for Jardiance, and send patient the information on this.    I spent 60 minutes with this patient today. I offer these  suggestions for consideration by Dr. Freire. A copy of the visit note was provided to the patient's primary care provider.    Will follow up in 3 weeks.    The patient was mailed a summary of these recommendations.     Mel Silverman PharmD  Medication Therapy Management Pharmacist  Pager: [phone]

## 2020-10-20 ENCOUNTER — ALLIED HEALTH/NURSE VISIT (OUTPATIENT)
Dept: PHARMACY | Facility: PHYSICIAN GROUP | Age: 70
End: 2020-10-20
Payer: COMMERCIAL

## 2020-10-20 DIAGNOSIS — E11.8 TYPE 2 DIABETES MELLITUS WITH COMPLICATION, WITHOUT LONG-TERM CURRENT USE OF INSULIN (H): Primary | ICD-10-CM

## 2020-10-20 PROCEDURE — 99606 MTMS BY PHARM EST 15 MIN: CPT | Performed by: PHARMACIST

## 2020-10-20 PROCEDURE — 99607 MTMS BY PHARM ADDL 15 MIN: CPT | Performed by: PHARMACIST

## 2020-10-20 RX ORDER — GLIPIZIDE 2.5 MG/1
2.5 TABLET, EXTENDED RELEASE ORAL
COMMUNITY

## 2020-10-20 NOTE — PROGRESS NOTES
MTM ENCOUNTER  SUBJECTIVE/OBJECTIVE:                           Maik Hammond is a 69 year old male called for a follow-up visit. He was referred to me from Dr. Freire.  Today's visit is a follow-up MTM visit from 9/29/2020.     Chief Complaint: Follow up medication review on diabetes.  Follow up on changing glimepiride.      Medication Adherence/Access: Cost is a big concern, Jardiance is very expensive and is in the donut hole right now.  Has been taking Jardiance 1/2 tablet to help with the cost, it lasts longer.       Type 2 Diabetes:  Pt currently taking Metformin  mg twice daily, Lantus 29 units daily, Glipizide ER 2.5 mg daily, and Jardiance 5 mg once daily AM.  Dr. Freire increased the Lantus at their last apt  Pt plans to start glipizide this Friday.  Today patient mentioned that he did have achy joints when on glipizide previously.  He didn't mention this during our last follow up, and wasn't documented in his chart.  Reviewed side effects of glipizide with patient, and achy joints isn't commonly reported.  Could have been coincidence that this symptoms occurred while he was on it, but hard to be sure.  Would still be safe for him re-try glipizide to see if better tolerated.    He is feeling better since stopping glimepiride.  Not having the shaking as much.  Even after two days he noticed a significant improvement in being able to write and  things after stopping glimepiride.    The Jardiance is very expensive so he stretches it out by cutting in half. He only takes 10 mg if his BG is above 200 in the morning, and only doing this about 2-3 days per month. He does also notice increased urination when he takes the higher 10 mg dose of Jardiance.  Takes AM and then this urinary urgency effect carries over into into the night as well.  It isn't too bothersome, but hasn't taken the 10 mg dose regularly.  Max of a few days in a row when BG are higher.  Has been using more since being off glimepiride  the last couple weeks.  He hasn't been on a higher dose of metformin, just recently refilled the 750 mg dose and would like to finish this out.  Pt is experiencing the following side effects: increased urinary urgency with Jardiance.  Denies GI upset, diarrhea with Metformin.   Hx: Farxiga, stopped when changed to basal insulin.  Invokana - doesn't recall having side effects, thinks it was stopped due to cost.  Originally had samples.  Pioglitazone, doesn't remember why stopped.  Onglyza, stopped due to cost.  Glipizide XL - stopped when he switched to Onglyza, pt notes joint aches on this.  Pt hasn't tried metformin 1000 mg BID in the past.    SMBG: one time daily, AM fasting      Ranges (patient reported, mg/dL):   130-150 mg/dl most days, is having a few more days in range of 170-220 since stopping glimepiride.  The last couple days had 180 and 214 (ate at 10 PM).  Has been taking full Jardiance dose on days when BG higher.  Hypoglycemia? None  Recent symptoms of high blood sugar? polydipsia, polyuria, polyphagia, fatigue, tingling and numbness  Eye exam: up to date  Foot exam: up to date  ACEi/ARB: Yes: Lisinopril 5mg daily.   Aspirin: Taking 81mg daily and denies side effects  Diet/Exercise: as been going on a 1 mile walk every day.  Breakfast: Chicken fahita ingredients with out tortilla,   Lunch: sandwich, and a cookie  Dinner: liver sausage and pickles.  Doesn't usually snack a lot.  Last BMP WNL        GLYCOSYLATED HGB A1C - 09/18/2020        NAME VALUE REFERENCE RANGE LAB   F HGB A1C 9.1  H                      Lab Results   Component Value Date     A1C 9.0 05/11/2020     A1C 9.3 08/09/2019     A1C 11.0 04/05/2019        Today's Vitals: There were no vitals taken for this visit.  - telephone encounter    ASSESSMENT:                              Medication Adherence: No issues identified.    Type 2 Diabetes:   Patient is not meeting A1c goal of < 7.5%. Self monitoring of blood glucose is not at goal of  fasting  mg/dL. Pt would benefit from Increasing Jardiance to 10 mg daily, but cost is a barrier.  We could switch to the 25 mg tablet, and then he could take a 1/2 tablet of this (12.5 mg daily).  Would need to recheck Scr 2 weeks after starting the higher dose.  Patient would also benefit from maximizing his metformin ER to 1000 mg twice daily.  He would want to finish his current supply of Metformin  mg tablets, and has enough until next year.    Tremors/shaking improved since stopping glimepiride.  Plan in place to re-try the glipizide again.  Patient will monitor for joint aches.  If not tolerated could try taking the higher Jardiance dose (1/2 tab 25 mg dose).    The ADA recommends doing a yearly microalbumin to monitor renal changes, due for this.       PLAN:                            1.  Plan in place.  Patient will start Glipizide ER 2.5 mg once daily this upcoming Friday.    I spent 30 minutes with this patient today. All changes were made via verbal approval with Dr. Freire. A copy of the visit note was provided to the patient's primary care provider.    Will follow up in 4 weeks.    The patient was mailed a summary of these recommendations.     Mel Silverman, PharmD  Medication Therapy Management Pharmacist  Pager: [phone]      Patient consented to a telehealth visit: yes  Telemedicine Visit Details  Type of service:  Telephone visit  Start Time: 1:05 PM  End Time: 1:27 PM  Originating Location (pt. Location): Home  Distant Location (provider location):  [facility]  Mode of Communication:  Telephone

## 2020-10-20 NOTE — PATIENT INSTRUCTIONS
Recommendations from today's MTM visit:                                                    MTM (medication therapy management) is a service provided by a clinical pharmacist designed to help you get the most of out of your medicines.   Today we reviewed what your medicines are for, how to know if they are working, that your medicines are safe and how to make your medicine regimen as easy as possible.     1. We discussed our plan to start Glipizide ER 2.5 mg once daily in the morning before breakfast.      Please monitor for changes in joint pain, and let me know if this symptoms gets worse.  You mentioned that in the past on this medication you noticed some joint pains that developed after awhile of being on it.  It isn't a common side effects, so it's hard to say if it was this medication that caused the joint pain or possibly something else.  If this joint pain returns please let me know and we can discuss alternative options with Dr. Freire.      It was great to speak with you today.  I value your experience and would be very thankful for your time with providing feedback on our clinic survey. You may receive a survey via email or text message in the next few days.     Next MTM visit: 11/17/2020 at 1:30 PM    To schedule another MTM appointment, please call the clinic directly or you may call the MTM scheduling line at [phone] or toll-free at [phone].     My Clinical Pharmacist's contact information:                                                      It was a pleasure talking with you today!  Please feel free to contact me with any questions or concerns you have.      Mel Silverman, Remy  Medication Therapy Management Pharmacist  Pager: [phone]

## 2020-10-24 VITALS
WEIGHT: 189.6 LBS | HEIGHT: 67 IN | SYSTOLIC BLOOD PRESSURE: 114 MMHG | HEART RATE: 88 BPM | DIASTOLIC BLOOD PRESSURE: 70 MMHG | BODY MASS INDEX: 29.76 KG/M2

## 2020-11-17 ENCOUNTER — ALLIED HEALTH/NURSE VISIT (OUTPATIENT)
Dept: PHARMACY | Facility: PHYSICIAN GROUP | Age: 70
End: 2020-11-17
Payer: COMMERCIAL

## 2020-11-17 DIAGNOSIS — E11.8 TYPE 2 DIABETES MELLITUS WITH COMPLICATION, WITHOUT LONG-TERM CURRENT USE OF INSULIN (H): Primary | ICD-10-CM

## 2020-11-17 PROCEDURE — 99606 MTMS BY PHARM EST 15 MIN: CPT | Performed by: PHARMACIST

## 2020-11-17 PROCEDURE — 99607 MTMS BY PHARM ADDL 15 MIN: CPT | Performed by: PHARMACIST

## 2020-11-17 NOTE — PROGRESS NOTES
MTM ENCOUNTER  SUBJECTIVE/OBJECTIVE:                           Maik Hammond is a 69 year old male called for a follow-up visit. He was referred to me from Dr. Freire.  Today's visit is a follow-up MTM visit from 10/20.     Reason for visit: Follow up on diabetes, and change from glimepiride to glipizide.      Medication Adherence/Access:   Cost is a big concern, Jardiance is very expensive and is in the donut hole right now.  Has been taking Jardiance 1/2 tablet to help with the cost, it lasts longer.       Type 2 Diabetes:  Pt currently taking Metformin  mg twice daily, Lantus 29 units daily, Glipizide ER 2.5 mg daily (started on 10/20), and Jardiance 5 mg once daily AM.  Dr. Freire increased the Lantus at their last apt  Patient said he is still having some shaking in his hands.  He had stopped glimepiride and was off a SFU for 2-3 weeks before starting the glipizide. He is feeling better since stopping glimepiride.  Not having the shaking as much.  Even after two days he noticed a significant improvement in being able to write and  things after stopping glimepiride.  It improved by about 50%, he thinks it was possibly the glimepiride.  Now he can hold things without dropping them.  Isn't sure if the shaking worsened or stayed the same when he started glipizide, but still noticing it.    Pt is experiencing the following side effects: increased urinary urgency with Jardiance.  Denies GI upset, diarrhea with Metformin.   Hx: Farxiga, stopped when changed to basal insulin.  Invokana - doesn't recall having side effects, thinks it was stopped due to cost.  Originally had samples.  Pioglitazone, doesn't remember why stopped.  Onglyza, stopped due to cost.  Glipizide XL - stopped when he switched to Onglyza, pt notes joint aches on this.  Pt hasn't tried metformin 1000 mg BID in the past.    SMBG: one time daily, AM fasting      Ranges (patient reported, mg/dL):   BG has been doing better.  AM fasting 135  today, but sometimes up into the low 200's  14 day average: 199  Hypoglycemia? None  Recent symptoms of high blood sugar? polydipsia, polyuria, polyphagia, fatigue, tingling and numbness.  He notices more issues with joint  Eye exam: up to date  Foot exam: up to date  ACEi/ARB: Yes: Lisinopril 5mg daily.   Aspirin: Taking 81mg daily and denies side effects  Diet/Exercise: as been going on a 1 mile walk every day.  Breakfast: Chicken fahita ingredients with out tortilla,   Lunch: sandwich, and a cookie  Dinner: liver sausage and pickles.  Doesn't usually snack a lot.  Last BMP WNL          Hemoglobin A1C   Date Value Ref Range Status   09/18/2020 9.1 (A) 4.2 - 6.1 % Final   05/11/2020 9.0 (A) 4.2 - 6.1 % Final   08/09/2019 9.3 (A) 4.2 - 6.1 % Final         Today's Vitals: There were no vitals taken for this visit. - telephone encounter      ASSESSMENT:                              Medication Adherence: No issues identified    Type 2 Diabetes:   Patient is not meeting A1c goal of < 7.5%. Self monitoring of blood glucose is not at goal of fasting  mg/dL. Pt would benefit from Increasing Jardiance to 10 mg daily, but cost is a barrier.  We could switch to the 25 mg tablet, and then he could take a 1/2 tablet of this (12.5 mg daily).  Would need to recheck Scr 2 weeks after starting the higher dose.  Patient would also benefit from maximizing his metformin ER to 1000 mg twice daily.  He would want to finish his current supply of Metformin  mg tablets, and has enough until next year.   Will wait until his next A1c and see how it is with glipizide and taking Jardiance more frequently.  Unclear if the glipizide is causing or contributing to his tremor.  It is a possibly side effect, but unlikely.  This symptoms was also present when he was off both sulfonylureas.  May be beneficial to consider a neurology referral for assessment of his tremor, will discuss with PCP>  The ADA recommends doing a yearly  microalbumin to monitor renal changes, due for this.    PLAN:                            1.  Continue Glipizide 2.5 mg once daily.      Future considerations:  2.  If A1c still above goal, recommend increasing Metformin ER to 1000 mg twice daily.  3.  If shaking/tremor still present, consider stopping glipizide also and referral to neurology for further assessment.  4.  Jardiance could be increased to 25 mg tablet, and taking 12.5 mg once daily.  Recheck BMP 2 weeks after dose increase.     Microalbumin planned for next apt.    I spent 30 minutes with this patient today. I offer these suggestions for consideration by Dr. Freire. A copy of the visit note was provided to the patient's primary care provider.    Will follow up in 2 months.    The patient declined a summary of these recommendations.     Mel Silverman, PharmD  Medication Therapy Management Pharmacist  Pager: [phone]      Patient consented to a telehealth visit: yes  Telemedicine Visit Details  Type of service:  Telephone visit  Start Time: 1:00 PM  End Time: 1:27 PM  Originating Location (patient location): Home  Distant Location (provider location):  [facility]  Mode of Communication:  Telephone

## 2020-12-22 ENCOUNTER — RECORDS - HEALTHEAST (OUTPATIENT)
Dept: LAB | Facility: CLINIC | Age: 70
End: 2020-12-22

## 2020-12-22 LAB
ALBUMIN SERPL-MCNC: 4.1 G/DL (ref 3.5–5)
ALP SERPL-CCNC: 77 U/L (ref 45–120)
ALT SERPL W P-5'-P-CCNC: 18 U/L (ref 0–45)
ANION GAP SERPL CALCULATED.3IONS-SCNC: 11 MMOL/L (ref 5–18)
AST SERPL W P-5'-P-CCNC: 19 U/L (ref 0–40)
BILIRUB SERPL-MCNC: 0.8 MG/DL (ref 0–1)
BUN SERPL-MCNC: 18 MG/DL (ref 8–28)
CALCIUM SERPL-MCNC: 9.6 MG/DL (ref 8.5–10.5)
CHLORIDE BLD-SCNC: 104 MMOL/L (ref 98–107)
CHOLEST SERPL-MCNC: 128 MG/DL
CO2 SERPL-SCNC: 24 MMOL/L (ref 22–31)
CREAT SERPL-MCNC: 0.81 MG/DL (ref 0.7–1.3)
FASTING STATUS PATIENT QL REPORTED: NORMAL
GFR SERPL CREATININE-BSD FRML MDRD: >60 ML/MIN/1.73M2
GLUCOSE BLD-MCNC: 246 MG/DL (ref 70–125)
HDLC SERPL-MCNC: 56 MG/DL
LDLC SERPL CALC-MCNC: 57 MG/DL
POTASSIUM BLD-SCNC: 4.4 MMOL/L (ref 3.5–5)
PROT SERPL-MCNC: 6.9 G/DL (ref 6–8)
SODIUM SERPL-SCNC: 139 MMOL/L (ref 136–145)
TRIGL SERPL-MCNC: 73 MG/DL

## 2021-03-29 ENCOUNTER — TRANSFERRED RECORDS (OUTPATIENT)
Dept: HEALTH INFORMATION MANAGEMENT | Facility: CLINIC | Age: 71
End: 2021-03-29

## 2021-03-29 LAB — HBA1C MFR BLD: 9.4 % (ref 4.2–6.1)

## 2021-05-13 VITALS — SYSTOLIC BLOOD PRESSURE: 118 MMHG | DIASTOLIC BLOOD PRESSURE: 72 MMHG

## 2021-06-02 ENCOUNTER — RECORDS - HEALTHEAST (OUTPATIENT)
Dept: ADMINISTRATIVE | Facility: CLINIC | Age: 71
End: 2021-06-02

## 2021-10-25 ENCOUNTER — TRANSFERRED RECORDS (OUTPATIENT)
Dept: HEALTH INFORMATION MANAGEMENT | Facility: CLINIC | Age: 71
End: 2021-10-25

## 2021-10-25 LAB — HBA1C MFR BLD: 9.1 % (ref 4.2–6.1)

## 2021-10-25 ASSESSMENT — MIFFLIN-ST. JEOR: SCORE: 1658.91

## 2021-10-27 NOTE — PROGRESS NOTES
Medication Therapy Management (MTM) Encounter    ASSESSMENT:                            Medication Adherence/Access: No issues identified    Type 2 Diabetes: Patient is not meeting A1c goal of < 7.5. Self monitoring of blood glucose is not at goal of fasting  mg/dL and post prandial < 180 mg/dL. Patient would benefit from increasing the dose of metformin ER to 1000 mg twice daily, or increasing the glipizide dose.  Patient would like to continue working on diet changes first and check in 4 weeks from now on home BG.  Wouldn't recommend we increase jardiance due to BP at low end of normal.    Hypertension: Stable. Patient is meeting blood pressure goal of < 140/90mmHg.    Hyperlipidemia: Stable.  Patient is on moderate intensity statin which is indicated based on 2019 ACC/AHA guidelines for lipid management.      GERD: Stable.  Patient has plan to make diet changes to help with symptoms, and plans to taper off medication on his own.  Declines prescription for famotidine.    Supplements/OTCs: Stable.    PLAN:                            1.  Continue current medications.  We discussed trying to work on decreasing carbohydrate intake to help get blood sugars down.    Follow-up: Return in 3 weeks (on 11/18/2021) for MTM Follow Up, Diabetes Follow Up.      SUBJECTIVE/OBJECTIVE:                          Maik Hammond is a 70 year old male called for a follow-up visit. He was referred to me from Dr. Freire.  Today's visit is a follow-up MTM visit from 11/17/2020     Reason for visit: Follow up on diabetes.    Allergies/ADRs: Reviewed in chart  Past Medical History: Reviewed in chart  Tobacco: He reports that he has never smoked. He does not have any smokeless tobacco history on file.  Alcohol: not currently using      Medication Adherence/Access: Cost has been a big concern for him.  Went into the donut hole last year with Jardiance    Type 2 Diabetes:  Currently taking Jardiance 10 mg daily, Glipizide 2.5 mg daily,  Metformin  mg twice daily, and Lantus 35 units daily.  He had apt with Deven KNIGHT, who suggested increasing the metformin dose but Maik was hesitant.  He is working on cutting down carbs.  He is going to stop eating his lunch and have a protein shake instead.  Instead of chips  He wasn't   Patient is not experiencing side effects.   He has shaking across his body, doesn't think the Glipizide.  At previous follow ups he thought the shaking was from glimepiride and glipizide, but now doesn't think so.  Glimepiride - shaking in hands  Bydureon - severe nausea.    Blood sugar monitorin time(s) daily. Ranges (patient reported): Fasting- 162, 169, 127, 136, 148, 221, 251, 181, 113, 176, 171.   Symptoms of low blood sugar? none, Frequency of lows- never, lowest was 113  Symptoms of high blood sugar? polyuria  Eye exam: up to date, has   Foot exam: up to date  Diet/Exercise: Diet sodas, trying to cut back.  Drinks coffee 4 cups in the AM.    Tried to use his health rider bike or do Wii exercises 1.5 hours every other day.    AM - sausage, eggs, and maybe piece of toast  Lunch/ dinner- sandwich, [employer].  May have chips with his meal, small personal bags.  Not eating as many sweets, cut out cookies   Aspirin: Taking 81mg daily and denies side effects  Statin: Yes   ACEi/ARB: Yes.   Urine Albumin: Unable to view labs in W, system was down.  Will review at follow up    Lab Results   Component Value Date    A1C 9.4 2021    A1C 9.1 2020    A1C 9.0 2020    A1C 9.3 2019    A1C 11.0 2019       Hypertension: Current medications include Lisinopril 5 mg daily.  Patient does not self-monitor blood pressure.  Patient reports no current medication side effects.  BP Readings from Last 3 Encounters:   21 118/72   20 114/70   20 114/70     Hyperlipidemia: Current therapy includes Simvastatin 40 mg daily.  Patient reports no significant myalgias or other side effects.    Recent  Labs   Lab Test 12/22/20  1034 11/11/19  1540   CHOL 128 122   HDL 56 59   LDL 57 49   TRIG 73 72     GERD: Current medications include: Prilosec (omeprazole) 20 mg once daily. He started using this OTC a few weeks ago.  He is going to try coming off it. Pt denies side effecis.  He plans to stop drinking soda after a certain time of day, and stop eating after 9 PM.  Doesn't want to try H2RA right now, thinks he can come off it fine.  Last night he didn't take omeprazole and did have some heartburn, but is starting his diet changes.  The patient does not have a history of GI bleed. Patient feels that current regimen is effective.    Supplements/OTCs: Taking Coenzyme Q-10 300 mg, multivitamin daily, and Move Free joint supplement once daily.  Denies issues.  ----------------      I spent 50 minutes with this patient today (an extra 15 minutes was spent creating the Medication Action Plan). All changes were made via collaborative practice agreement with Dutch Freire MD. A copy of the visit note was provided to the patient's primary care provider.    The patient was mailed a summary of these recommendations.     Mel Silverman PharmD  Medication Therapy Management Pharmacist  Pager: [phone]      Telemedicine Visit Details  Type of service:  Telephone visit  Start Time: 2:00 PM  End Time: 2:50 PM  Originating Location (patient location): [place]  Distant Location (provider location):  [facility]     Medication Therapy Recommendations  No medication therapy recommendations to display

## 2021-10-28 ENCOUNTER — VIRTUAL VISIT (OUTPATIENT)
Dept: PHARMACY | Facility: PHYSICIAN GROUP | Age: 71
End: 2021-10-28
Payer: COMMERCIAL

## 2021-10-28 DIAGNOSIS — K21.00 GASTROESOPHAGEAL REFLUX DISEASE WITH ESOPHAGITIS WITHOUT HEMORRHAGE: ICD-10-CM

## 2021-10-28 DIAGNOSIS — E11.8 TYPE 2 DIABETES MELLITUS WITH COMPLICATION, WITHOUT LONG-TERM CURRENT USE OF INSULIN (H): Primary | ICD-10-CM

## 2021-10-28 DIAGNOSIS — E78.5 HYPERLIPIDEMIA LDL GOAL <70: ICD-10-CM

## 2021-10-28 DIAGNOSIS — I10 BENIGN ESSENTIAL HYPERTENSION: ICD-10-CM

## 2021-10-28 DIAGNOSIS — Z78.9 TAKES DIETARY SUPPLEMENTS: ICD-10-CM

## 2021-10-28 PROCEDURE — 99605 MTMS BY PHARM NP 15 MIN: CPT | Performed by: PHARMACIST

## 2021-10-28 PROCEDURE — 99607 MTMS BY PHARM ADDL 15 MIN: CPT | Performed by: PHARMACIST

## 2021-10-28 NOTE — LETTER
"        Date: 2021    Maik Hammond  [address]  [facility] [address]    Dear Mr. Hammond,    Thank you for talking with me on 10/28/2021 about your health and medications. Medicare s MTM (Medication Therapy Management) program helps you understand your medications and use them safely.      This letter includes an action plan (Medication Action Plan) and medication list (Personal Medication List). The action plan has steps you should take to help you get the best results from your medications. The medication list will help you keep track of your medications and how to use them the right way.       Have your action plan and medication list with you when you talk with your doctors, pharmacists, and other healthcare providers in your care team.     Ask your doctors, pharmacists, and other healthcare providers to update the action plan and medication list at every visit.     Take your medication list with you if you go to the hospital or emergency room.     Give a copy of the action plan and medication list to your family or caregivers.     If you want to talk about this letter or any of the papers with it, please call   [phone].We look forward to working with you, your doctors, and other healthcare providers to help you stay healthy through the [employer] [place] MTM program.    Sincerely,  Mel Silverman RPH    Enclosed: Medication Action Plan and Personal Medication List    MEDICATION ACTION PLAN FOR Maik Hammond,  1950     This action plan will help you get the best results from your medications if you:   1. Read \"What we talked about.\"   2. Take the steps listed in the \"What I need to do\" boxes.   3. Fill in \"What I did and when I did it.\"   4. Fill in \"My follow-up plan\" and \"Questions I want to ask.\"     Have this action plan with you when you talk with your doctors, pharmacists, and other healthcare providers in your care team. Share this with your " family or caregivers too.  DATE PREPARED: 2021  What we talked about: What my medicines are for, how to know if my medicines are working, made sure my medicines are safe for me and reviewed how to take my medicines.                                                     What I need to do: Take my medicines every day         What I did and when I did it:                                              My follow-up plan:                 Questions I want to ask:              If you have any questions about your action plan, call Mel Silverman [facility], Phone: [phone] , Monday-Friday 8-4:30pm.           PERSONAL MEDICATION LIST FOR Maik Hammond,  1950     This medication list was made for you after we talked. We also used information from your doctor's chart.      Use blank rows to add new medications. Then fill in the dates you started using them.    Cross out medications when you no longer use them. Then write the date and why you stopped using them.    Ask your doctors, pharmacists, and other healthcare providers to update this list at every visit. Keep this list up-to-date with:       Prescription medications    Over the counter drugs     Herbals    Vitamins    Minerals      If you go to the hospital or emergency room, take this list with you. Share this with your family or caregivers too.     DATE PREPARED: 2021  Allergies or side effects: Other [no clinical screening - see comments]     Medication:  ASPIRIN 81 MG PO TABS      How I use it:  Take 1 tablet by mouth daily In morning      Why I use it:  Prevent heart attack and stroke    Prescriber:  Dr. Freire      Date I started using it:       Date I stopped using it:         Why I stopped using it:            Medication:  COENZYME Q10 300 MG PO CAPS      How I use it:  Take 300 mg by mouth daily      Why I use it: General Health    Prescriber:  Dr. Freire      Date I started using it:       Date I stopped using it:         Why I stopped using  it:            Medication:  CONTOUR BLOOD GLUCOSE SYSTEM W/DEVICE KIT      How I use it:  Use to test blood sugars 2 times daily      Why I use it:  Diabetes    Prescriber:  Dr. Freire      Date I started using it:       Date I stopped using it:         Why I stopped using it:            Medication:  EMPAGLIFLOZIN 10 MG PO TABS      How I use it:  Take 10 mg by mouth daily       Why I use it:  Diabetes    Prescriber:  Dr. Freire      Date I started using it:       Date I stopped using it:         Why I stopped using it:            Medication:  GLIPIZIDE ER 2.5 MG PO TB24      How I use it:  Take 2.5 mg by mouth daily before breakfast      Why I use it:  Diabetes    Prescriber:  Dr. Freire      Date I started using it:       Date I stopped using it:         Why I stopped using it:            Medication:  INSULIN GLARGINE  UNIT/ML SC SOPN      How I use it:  Inject 35 Units Subcutaneous every morning       Why I use it:  Diabetes    Prescriber:  Dr. Freire      Date I started using it:       Date I stopped using it:         Why I stopped using it:            Medication:  LISINOPRIL 5 MG PO TABS      How I use it:  Take 5 mg by mouth daily Morning        Why I use it: High blood pressure    Prescriber:  Dr. Freire      Date I started using it:       Date I stopped using it:         Why I stopped using it:            Medication:  METFORMIN HCL  MG PO TB24      How I use it:  Take 750 mg by mouth 2 times daily      Why I use it:  Diabetes    Prescriber:  Dr. Freire      Date I started using it:       Date I stopped using it:         Why I stopped using it:            Medication:  MOVE FREE PO      How I use it:  Take 1 capsule by mouth daily      Why I use it:  General health    Prescriber:  Patient Reported      Date I started using it:       Date I stopped using it:         Why I stopped using it:            Medication:  MULTIVITAMIN OR      How I use it:  Take 1 tablet by mouth daily      Why I use it:   General health    Prescriber:  Patient Reported      Date I started using it:       Date I stopped using it:         Why I stopped using it:            Medication:  OMEPRAZOLE 20 MG PO CPDR      How I use it:  Take 20 mg by mouth daily      Why I use it:  Heartburn    Prescriber:  Patient Reported      Date I started using it:       Date I stopped using it:         Why I stopped using it:            Medication:  SIMVASTATIN 40 MG PO TABS      How I use it:  Take 1 tablet by mouth At Bedtime      Why I use it:  High cholesterol    Prescriber:  Dr. Freire      Date I started using it:       Date I stopped using it:         Why I stopped using it:            Medication:         How I use it:         Why I use it:      Prescriber:         Date I started using it:       Date I stopped using it:         Why I stopped using it:            Medication:         How I use it:         Why I use it:      Prescriber:         Date I started using it:       Date I stopped using it:         Why I stopped using it:            Medication:         How I use it:         Why I use it:      Prescriber:         Date I started using it:       Date I stopped using it:         Why I stopped using it:              Other Information:     If you have any questions about your medication list, call Mel Silverman [facility], Phone: [phone] , Monday-Friday 8-4:30pm.    According to the Paperwork Reduction Act of 1995, no persons are required to respond to a collection of information unless it displays a valid OMB control number. The valid OMB number for this information collection is 2936-3423. The time required to complete this information collection is estimated to average 40 minutes per response, including the time to review instructions, searching existing data resources, gather the data needed, and complete and review the information collection. If you have any comments concerning the accuracy of the time estimate(s) or suggestions for  improving this form, please write to: CMS, Attn: JANNY Reports Clearance Officer, [address] [place] [address], [place], [place] [address].

## 2021-11-01 VITALS
DIASTOLIC BLOOD PRESSURE: 60 MMHG | WEIGHT: 213 LBS | HEART RATE: 72 BPM | HEIGHT: 66 IN | SYSTOLIC BLOOD PRESSURE: 106 MMHG | BODY MASS INDEX: 34.23 KG/M2

## 2021-11-01 NOTE — PATIENT INSTRUCTIONS
Recommendations from today's MTM visit:                                                    MTM (medication therapy management) is a service provided by a clinical pharmacist designed to help you get the most of out of your medicines.   Today we reviewed what your medicines are for, how to know if they are working, that your medicines are safe and how to make your medicine regimen as easy as possible.      1.  Continue current medications.  We discussed trying to work on decreasing carbohydrate intake to help get blood sugars down.    Follow-up: Return in 3 weeks (on 11/18/2021) for MTM Follow Up, Diabetes Follow Up.    It was great to speak with you today.  I value your experience and would be very thankful for your time with providing feedback on our clinic survey. You may receive a survey via email or text message in the next few days.     To schedule another MTM appointment, please call the clinic directly or you may call the MTM scheduling line at [phone] or toll-free at [phone].     My Clinical Pharmacist's contact information:                                                      Please feel free to contact me with any questions or concerns you have.      Mel Silverman, Remy  Medication Therapy Management Pharmacist  Pager: [phone]

## 2022-02-14 ENCOUNTER — TRANSFERRED RECORDS (OUTPATIENT)
Dept: HEALTH INFORMATION MANAGEMENT | Facility: CLINIC | Age: 72
End: 2022-02-14
Payer: COMMERCIAL

## 2022-02-14 ENCOUNTER — LAB REQUISITION (OUTPATIENT)
Dept: LAB | Facility: CLINIC | Age: 72
End: 2022-02-14

## 2022-02-14 DIAGNOSIS — E78.5 HYPERLIPIDEMIA, UNSPECIFIED: ICD-10-CM

## 2022-02-14 DIAGNOSIS — I10 ESSENTIAL (PRIMARY) HYPERTENSION: ICD-10-CM

## 2022-02-14 LAB — HBA1C MFR BLD: 10.3 % (ref 4.2–6.1)

## 2022-02-14 PROCEDURE — 80053 COMPREHEN METABOLIC PANEL: CPT | Performed by: PHYSICIAN ASSISTANT

## 2022-02-14 PROCEDURE — 80061 LIPID PANEL: CPT | Performed by: PHYSICIAN ASSISTANT

## 2022-02-15 LAB
ALBUMIN SERPL-MCNC: 4 G/DL (ref 3.5–5)
ALP SERPL-CCNC: 89 U/L (ref 45–120)
ALT SERPL W P-5'-P-CCNC: 16 U/L (ref 0–45)
ANION GAP SERPL CALCULATED.3IONS-SCNC: 15 MMOL/L (ref 5–18)
AST SERPL W P-5'-P-CCNC: 17 U/L (ref 0–40)
BILIRUB SERPL-MCNC: 0.9 MG/DL (ref 0–1)
BUN SERPL-MCNC: 15 MG/DL (ref 8–28)
CALCIUM SERPL-MCNC: 9.8 MG/DL (ref 8.5–10.5)
CHLORIDE BLD-SCNC: 100 MMOL/L (ref 98–107)
CHOLEST SERPL-MCNC: 122 MG/DL
CO2 SERPL-SCNC: 22 MMOL/L (ref 22–31)
CREAT SERPL-MCNC: 0.81 MG/DL (ref 0.7–1.3)
FASTING STATUS PATIENT QL REPORTED: NORMAL
GFR SERPL CREATININE-BSD FRML MDRD: >90 ML/MIN/1.73M2
GLUCOSE BLD-MCNC: 229 MG/DL (ref 70–125)
HDLC SERPL-MCNC: 47 MG/DL
LDLC SERPL CALC-MCNC: 58 MG/DL
POTASSIUM BLD-SCNC: 4.5 MMOL/L (ref 3.5–5)
PROT SERPL-MCNC: 7 G/DL (ref 6–8)
SODIUM SERPL-SCNC: 137 MMOL/L (ref 136–145)
TRIGL SERPL-MCNC: 85 MG/DL

## 2022-05-09 ENCOUNTER — TRANSFERRED RECORDS (OUTPATIENT)
Dept: HEALTH INFORMATION MANAGEMENT | Facility: CLINIC | Age: 72
End: 2022-05-09
Payer: COMMERCIAL

## 2022-05-09 LAB — HBA1C MFR BLD: 10.1 % (ref 4.2–6.1)

## 2023-01-01 ENCOUNTER — TRANSFERRED RECORDS (OUTPATIENT)
Dept: HEALTH INFORMATION MANAGEMENT | Facility: CLINIC | Age: 73
End: 2023-01-01

## 2023-01-01 ENCOUNTER — OFFICE VISIT (OUTPATIENT)
Dept: PHARMACY | Facility: PHYSICIAN GROUP | Age: 73
End: 2023-01-01
Payer: COMMERCIAL

## 2023-01-01 ENCOUNTER — VIRTUAL VISIT (OUTPATIENT)
Dept: PHARMACY | Facility: PHYSICIAN GROUP | Age: 73
End: 2023-01-01
Payer: COMMERCIAL

## 2023-01-01 ENCOUNTER — LAB REQUISITION (OUTPATIENT)
Dept: LAB | Facility: CLINIC | Age: 73
End: 2023-01-01

## 2023-01-01 VITALS — DIASTOLIC BLOOD PRESSURE: 70 MMHG | SYSTOLIC BLOOD PRESSURE: 112 MMHG

## 2023-01-01 DIAGNOSIS — E11.8 TYPE 2 DIABETES MELLITUS WITH COMPLICATION, WITHOUT LONG-TERM CURRENT USE OF INSULIN (H): Primary | ICD-10-CM

## 2023-01-01 DIAGNOSIS — E11.65 TYPE 2 DIABETES MELLITUS WITH HYPERGLYCEMIA (H): ICD-10-CM

## 2023-01-01 DIAGNOSIS — I10 ESSENTIAL (PRIMARY) HYPERTENSION: ICD-10-CM

## 2023-01-01 DIAGNOSIS — E78.5 HYPERLIPIDEMIA LDL GOAL <70: ICD-10-CM

## 2023-01-01 DIAGNOSIS — Z12.5 ENCOUNTER FOR SCREENING FOR MALIGNANT NEOPLASM OF PROSTATE: ICD-10-CM

## 2023-01-01 DIAGNOSIS — I10 BENIGN ESSENTIAL HYPERTENSION: ICD-10-CM

## 2023-01-01 DIAGNOSIS — E78.5 HYPERLIPIDEMIA, UNSPECIFIED: ICD-10-CM

## 2023-01-01 DIAGNOSIS — K21.00 GASTROESOPHAGEAL REFLUX DISEASE WITH ESOPHAGITIS WITHOUT HEMORRHAGE: ICD-10-CM

## 2023-01-01 DIAGNOSIS — Z78.9 TAKES DIETARY SUPPLEMENTS: ICD-10-CM

## 2023-01-01 LAB
ALBUMIN SERPL BCG-MCNC: 4.6 G/DL (ref 3.5–5.2)
ALP SERPL-CCNC: 87 U/L (ref 40–129)
ALT SERPL W P-5'-P-CCNC: 19 U/L (ref 10–50)
ANION GAP SERPL CALCULATED.3IONS-SCNC: 17 MMOL/L (ref 7–15)
AST SERPL W P-5'-P-CCNC: 20 U/L (ref 10–50)
BILIRUB SERPL-MCNC: 0.6 MG/DL
BUN SERPL-MCNC: 18.5 MG/DL (ref 8–23)
CALCIUM SERPL-MCNC: 9.8 MG/DL (ref 8.8–10.2)
CHLORIDE SERPL-SCNC: 98 MMOL/L (ref 98–107)
CHOLEST SERPL-MCNC: 127 MG/DL
CREAT SERPL-MCNC: 0.71 MG/DL (ref 0.67–1.17)
CREAT UR-MCNC: 78.5 MG/DL
DEPRECATED HCO3 PLAS-SCNC: 23 MMOL/L (ref 22–29)
GFR SERPL CREATININE-BSD FRML MDRD: >90 ML/MIN/1.73M2
GLUCOSE SERPL-MCNC: 254 MG/DL (ref 70–99)
HBA1C MFR BLD: 11.1 % (ref 4.2–6.1)
HDLC SERPL-MCNC: 49 MG/DL
LDLC SERPL CALC-MCNC: 57 MG/DL
MICROALBUMIN UR-MCNC: 12.6 MG/L
MICROALBUMIN/CREAT UR: 16.05 MG/G CR (ref 0–17)
NONHDLC SERPL-MCNC: 78 MG/DL
POTASSIUM SERPL-SCNC: 4.2 MMOL/L (ref 3.4–5.3)
PROT SERPL-MCNC: 7.3 G/DL (ref 6.4–8.3)
PSA SERPL-MCNC: 1.2 NG/ML (ref 0–6.5)
SODIUM SERPL-SCNC: 138 MMOL/L (ref 136–145)
TRIGL SERPL-MCNC: 106 MG/DL

## 2023-01-01 PROCEDURE — 80053 COMPREHEN METABOLIC PANEL: CPT | Performed by: FAMILY MEDICINE

## 2023-01-01 PROCEDURE — G0103 PSA SCREENING: HCPCS | Performed by: FAMILY MEDICINE

## 2023-01-01 PROCEDURE — 99607 MTMS BY PHARM ADDL 15 MIN: CPT | Performed by: PHARMACIST

## 2023-01-01 PROCEDURE — 99605 MTMS BY PHARM NP 15 MIN: CPT | Performed by: PHARMACIST

## 2023-01-01 PROCEDURE — 80061 LIPID PANEL: CPT | Performed by: FAMILY MEDICINE

## 2023-01-01 PROCEDURE — 82570 ASSAY OF URINE CREATININE: CPT | Performed by: FAMILY MEDICINE

## 2023-01-01 PROCEDURE — 99606 MTMS BY PHARM EST 15 MIN: CPT | Performed by: PHARMACIST

## 2023-01-01 RX ORDER — METFORMIN HCL 500 MG
1000 TABLET, EXTENDED RELEASE 24 HR ORAL 2 TIMES DAILY WITH MEALS
COMMUNITY

## 2023-01-01 RX ORDER — AMOXICILLIN 500 MG
1200 CAPSULE ORAL DAILY
COMMUNITY

## 2023-07-24 NOTE — PROGRESS NOTES
Medication Therapy Management (MTM) Encounter    ASSESSMENT:                            Medication Adherence/Access: No issues identified    Type 2 Diabetes:  Needs improvement, not meeting A1c goal <7.5%. Recommend taking the prescribed amount of Lantus (38 units) instead of patient's current 35 units once daily and increasing Jardiance to 25 mg once daily. Patient declines CGM at this time.  Due for annual diabetes eye exam.     Hyperlipidemia: Stable.     Hypertension: Stable. Meeting BP goal <140/90.     GERD: Stable. Ok for patient to stop daily omeprazole as he has been inconsistent and denies symptoms. No history of GI bleed.     Supplements: Stable.     PLAN:                            1. Begin taking Jardiance 25 mg once daily.    2. Begin taking Lantus 38 units once daily.  3. It is ok to stop taking Omeprazole and start monitoring for breakthrough heartburn symptoms.     Follow-up: Return in about 4 weeks (around 8/22/2023) for Follow up, with me.    SUBJECTIVE/OBJECTIVE:                          Maik Hammond is a 72 year old male coming in for a follow-up visit from 10/28/21 with Mel.       Reason for visit: MTM.    Allergies/ADRs: Reviewed in chart  Past Medical History: Reviewed in chart  Tobacco: He reports that he has never smoked. He does not have any smokeless tobacco history on file.  Alcohol: none      Medication Adherence/Access: no issues reported.        Type 2 Diabetes:    Patient reports he has only been taking 35 units of Lantus once daily.  Denies any side effects.  Notes his activity level has slightly increased over the summer months as he is spending more time outside.  Offered information about CGM, however patient declines use at this time.  Jardiance 10 mg by mouth daily   Lantus 38 units injected once daily  metFORMIN HCl  MG two tablets twice daily     glipiZIDE ER 2.5 MG Tablet one daily   Aspirin 81mg daily  Patient is not experiencing side effects.  Blood sugar  monitorin time(s) daily; Ranges: (patient reported) Fasting- 200s   Current diabetes symptoms: none  Diet/Exercise: notes he has increased his daily activity and is spending more time outside during the summer months.   Eye exam: due  Foot exam: up to date  Urine Albumin:   Lab Results   Component Value Date    UMALCR 16.05 2023   A1c:   11.1%  3/2 10.5%     Hyperlipidemia:   Simvastatin 40 mg daily  Fish Oil 1,000 mg once daily  Patient reports no significant myalgias or other side effects.  Recent Labs   Lab Test 23  1436 22  1512   CHOL 127 122   HDL 49 47   LDL 57 58   TRIG 106 85     Hypertension:   Lisinopril 5 mg once daily   Patient reports no current medication side effects.  Patient does not self-monitor blood pressure.    BP Readings from Last 3 Encounters:   23 112/70   10/25/21 106/60   21 118/72     Pulse Readings from Last 3 Encounters:   10/25/21 72   20 88   20 88     GERD:   Omeprazole 20 mg once daily    Patient reports no current symptoms.  Patient is unsure if he is taking this daily.     Supplements:   Taking for general health and denies side effects at this time.   Multivitamin one daily   Move Free LTG [employer] one daily   Co Q-10 300 MG Capsule, one daily- patient notes he is not taking consistently      Today's Vitals: /70 (BP Location: Left arm)   ----------------      I spent 25 minutes with this patient today. All changes were made via collaborative practice agreement with Dutch Freire MD. A copy of the visit note was provided to the patient's provider(s).    A summary of these recommendations was mailed to the patient.    Felisha Casillas, PharmD  Medication Therapy Management Pharmacist     Medication Therapy Recommendations  Gastroesophageal reflux disease with esophagitis without hemorrhage    Current Medication: omeprazole (PRILOSEC) 20 MG DR capsule   Rationale: No medical indication at this time - Unnecessary  medication therapy - Indication   Recommendation: Discontinue Medication   Status: Accepted - no CPA Needed         Type 2 diabetes mellitus with complication, without long-term current use of insulin (H)    Current Medication: empagliflozin (JARDIANCE) 10 MG TABS tablet   Rationale: Dose too low - Dosage too low - Effectiveness   Recommendation: Increase Dose   Status: Accepted per CPA          Current Medication: insulin glargine (LANTUS SOLOSTAR PEN) 100 UNIT/ML pen   Rationale: Does not understand instructions - Adherence - Adherence   Recommendation: Provide Education   Status: Patient Agreed - Adherence/Education

## 2023-07-25 NOTE — LETTER
_  Medication List        Prepared on: 07/25/2023     Bring your Medication List when you go to the doctor, hospital, or   emergency room. And, share it with your family or caregivers.     Note any changes to how you take your medications.  Cross out medications when you no longer use them.    Medication How I take it Why I use it Prescriber   aspirin 81 MG tablet Take 1 tablet by mouth daily In morning Heart health Dutch rFeire    Coenzyme Q10 300 MG CAPS Take 300 mg by mouth daily General health Patient Reported   empagliflozin (JARDIANCE) 25 MG TABS tablet Take 25 mg by mouth daily  Diabetes Dutch Freire   glipiZIDE (GLUCOTROL XL) 2.5 MG 24 hr tablet Take 2.5 mg by mouth daily before breakfast Diabetes  Dutch Freire   Glucosamine-Chondroitin (MOVE FREE PO) Take 1 capsule by mouth daily Joint health Patient Reported   insulin glargine (LANTUS SOLOSTAR PEN) 100 UNIT/ML pen Inject 38 Units Subcutaneous every morning  Diabetes Dutch Freire    lisinopril (PRINIVIL/ZESTRIL) 5 MG tablet Take 5 mg by mouth daily    Blood pressure Dutch Freire    metFORMIN (GLUCOPHAGE XR) 500 MG 24 hr tablet Take 1,000 mg by mouth 2 times daily (with meals) Diabetes Dutch Freire    Multiple Vitamins-Minerals (MULTIVITAMIN OR) Take 1 tablet by mouth daily General health Patient Reported   Omega-3 Fatty Acids (FISH OIL) 1200 MG capsule Take 1,200 mg by mouth daily Heart health Patient Reported   simvastatin (ZOCOR) 40 MG tablet Take 1 tablet by mouth At Bedtime Cholesterol Dutch Freire          Add new medications, over-the-counter drugs, herbals, vitamins, or  minerals in the blank rows below.    Medication How I take it Why I use it Prescriber                                      Allergies:      none        Side effects I have had:               Other Information:              My notes and questions:

## 2023-07-25 NOTE — LETTER
July 25, 2023  Maik Hammond  [address]  [facility] [address]    Dear Mr. Hammond, [facility]     Thank you for talking with me on Jul 25, 2023 about your health and medications. As a follow-up to our conversation, I have included two documents:      Your Recommended To-Do List has steps you should take to get the best results from your medications.  Your Medication List will help you keep track of your medications and how to take them.    If you want to talk about these documents, please call Felisha Casillas RPH at phone: [phone], Monday-Friday 8-4:30pm.    I look forward to working with you and your doctors to make sure your medications work well for you.    Sincerely,  Felisha Casillas RPH  [facility] Pharmacist

## 2023-07-25 NOTE — LETTER
"Recommended To-Do List      Prepared on: 07/25/2023       You can get the best results from your medications by completing the items on this \"To-Do List.\"      Bring your To-Do List when you go to your doctor. And, share it with your family or caregivers.    My To-Do List:  What we talked about: What I should do:   A medication that you may no longer need    Stop taking omeprazole (priLOSEC)          What we talked about: What I should do:   Your medication dosage being too low    Increase your dosage of empagliflozin (JARDIANCE) to 25 mg once daily           What we talked about: What I should do:   The importance of taking your medication as intended    Begin taking Lantus 38 units once daily           What we talked about: What I should do:                     "

## 2023-08-22 NOTE — PATIENT INSTRUCTIONS
"Recommendations from today's MTM visit:                                                    MTM (medication therapy management) is a service provided by a clinical pharmacist designed to help you get the most of out of your medicines.      1. Continue taking medications as prescribed. Focus on dietary and lifestyle changes as well to help lower your blood sugars. We will recheck your A1c during your visit with Dr. Freire at the end of September.     Follow-up: Return in about 3 months (around 11/22/2023) for Follow up.    It was great speaking with you today.  I value your experience and would be very thankful for your time in providing feedback in our clinic survey. In the next few days, you may receive an email or text message from [email or web address] with a link to a survey related to your  clinical pharmacist.\"     To schedule another MTM appointment, please call the clinic directly or you may call the MTM scheduling line at [phone] or toll-free at [phone].     My Clinical Pharmacist's contact information:                                                      Please feel free to contact me with any questions or concerns you have.      Felisha Casillas, PharmD  Medication Therapy Management Pharmacist     "